# Patient Record
Sex: MALE | Race: WHITE
[De-identification: names, ages, dates, MRNs, and addresses within clinical notes are randomized per-mention and may not be internally consistent; named-entity substitution may affect disease eponyms.]

---

## 2017-12-27 ENCOUNTER — HOSPITAL ENCOUNTER (EMERGENCY)
Dept: HOSPITAL 10 - E/R | Age: 37
Discharge: HOME | End: 2017-12-27
Payer: SELF-PAY

## 2017-12-27 VITALS
BODY MASS INDEX: 35.82 KG/M2 | WEIGHT: 270.29 LBS | HEIGHT: 73 IN | HEIGHT: 73 IN | WEIGHT: 270.29 LBS | BODY MASS INDEX: 35.82 KG/M2

## 2017-12-27 VITALS
DIASTOLIC BLOOD PRESSURE: 79 MMHG | HEART RATE: 88 BPM | TEMPERATURE: 98.2 F | SYSTOLIC BLOOD PRESSURE: 135 MMHG | RESPIRATION RATE: 18 BRPM

## 2017-12-27 DIAGNOSIS — E11.65: Primary | ICD-10-CM

## 2017-12-27 DIAGNOSIS — Z79.84: ICD-10-CM

## 2017-12-27 DIAGNOSIS — I10: ICD-10-CM

## 2017-12-27 LAB
ADD UMIC: YES
ANION GAP SERPL CALC-SCNC: 17 MMOL/L (ref 8–16)
ANION GAP SERPL CALC-SCNC: 24 MMOL/L (ref 8–16)
BUN SERPL-MCNC: 13 MG/DL (ref 7–20)
BUN SERPL-MCNC: 15 MG/DL (ref 7–20)
CALCIUM SERPL-MCNC: 10.3 MG/DL (ref 8.4–10.2)
CALCIUM SERPL-MCNC: 9.4 MG/DL (ref 8.4–10.2)
CHLORIDE SERPL-SCNC: 102 MMOL/L (ref 97–110)
CHLORIDE SERPL-SCNC: 95 MMOL/L (ref 97–110)
CO2 SERPL-SCNC: 20 MMOL/L (ref 21–31)
CO2 SERPL-SCNC: 24 MMOL/L (ref 21–31)
COLOR UR: COLORLESS
CREAT SERPL-MCNC: 0.78 MG/DL (ref 0.61–1.24)
CREAT SERPL-MCNC: 0.84 MG/DL (ref 0.61–1.24)
EOSINOPHIL NFR BLD MANUAL: 2 % (ref 0–7)
ERYTHROCYTE [DISTWIDTH] IN BLOOD BY AUTOMATED COUNT: 11.1 % (ref 11.5–14.5)
GLUCOSE SERPL-MCNC: 367 MG/DL (ref 70–220)
GLUCOSE SERPL-MCNC: 630 MG/DL (ref 70–220)
GLUCOSE UR STRIP-MCNC: (no result) MG/DL
HCT VFR BLD CALC: 46.1 % (ref 42–52)
HGB BLD-MCNC: 17.5 G/DL (ref 14–18)
KETONES UR STRIP.AUTO-MCNC: (no result) MG/DL
MCH RBC QN AUTO: 30.9 PG (ref 29–33)
MCHC RBC AUTO-ENTMCNC: 38 G/DL (ref 32–37)
MCV RBC AUTO: 81.5 FL (ref 82–101)
MONOCYTES % (M): 5 % (ref 0–11)
NITRITE UR QL STRIP.AUTO: NEGATIVE MG/DL
PLATELET # BLD EST: NORMAL 10*3/UL
PLATELET # BLD: 186 10^3/UL (ref 140–440)
PMV BLD AUTO: 13.8 FL (ref 7.4–10.4)
POTASSIUM SERPL-SCNC: 4.3 MMOL/L (ref 3.5–5.1)
POTASSIUM SERPL-SCNC: 4.6 MMOL/L (ref 3.5–5.1)
RBC # BLD AUTO: 5.66 10^6/UL (ref 4.7–6.1)
RBC # UR AUTO: (no result) MG/DL
SODIUM SERPL-SCNC: 134 MMOL/L (ref 135–144)
SODIUM SERPL-SCNC: 139 MMOL/L (ref 135–144)
UR ASCORBIC ACID: NEGATIVE MG/DL
UR BILIRUBIN (DIP): NEGATIVE MG/DL
UR CLARITY: CLEAR
UR PH (DIP): 6 (ref 5–9)
UR RBC: 1 /HPF (ref 0–5)
UR SPECIFIC GRAVITY (DIP): 1.03 (ref 1–1.03)
UR TOTAL PROTEIN (DIP): (no result) MG/DL
UROBILINOGEN UR STRIP-ACNC: NEGATIVE MG/DL
VARIANT LYMPHS NFR BLD MANUAL: 1 % (ref 0–0)
WBC # BLD AUTO: 8.6 10^3/UL (ref 4.8–10.8)
WBC # UR STRIP: NEGATIVE LEU/UL

## 2017-12-27 PROCEDURE — 36415 COLL VENOUS BLD VENIPUNCTURE: CPT

## 2017-12-27 PROCEDURE — 81001 URINALYSIS AUTO W/SCOPE: CPT

## 2017-12-27 PROCEDURE — 83605 ASSAY OF LACTIC ACID: CPT

## 2017-12-27 PROCEDURE — 82962 GLUCOSE BLOOD TEST: CPT

## 2017-12-27 PROCEDURE — 99284 EMERGENCY DEPT VISIT MOD MDM: CPT

## 2017-12-27 PROCEDURE — 80048 BASIC METABOLIC PNL TOTAL CA: CPT

## 2017-12-27 PROCEDURE — 96372 THER/PROPH/DIAG INJ SC/IM: CPT

## 2017-12-27 PROCEDURE — 85025 COMPLETE CBC W/AUTO DIFF WBC: CPT

## 2017-12-27 NOTE — ERD
ER Documentation


Chief Complaint


Chief Complaint


LOSS WT, POLYDYPSIA, POLYURIA X2 WKS, SEND BY [facility]





HPI


Patient is a 37-year-old male with hypertension who presents saying "I think I 

have diabetes".  He has headache.  He feels thirsty and he has been urinating 

frequently.  It started 15 days ago.  He was seen by the [facility]

's clinic today and was found to have high blood sugar so he was sent to the ER.





ROS


All systems reviewed and are negative except as per history of present illness.





Medications


Home Meds


Active Scripts


Metformin* (Glucophage*) 500 Mg Tab, 500 MG PO BID, #60 TAB


   Prov:TOBI PUCKETT MD         12/27/17





Allergies


Allergies:  


Coded Allergies:  


     No Known Allergy (Unverified , 12/27/17)





PMhx/Soc


History of Surgery:  No


Anesthesia Reaction:  No


Hx Neurological Disorder:  No


Hx Respiratory Disorders:  No


Hx Cardiac Disorders:  Yes (HTN)


Hx Psychiatric Problems:  No


Hx Miscellaneous Medical Probl:  Yes (DM)


Hx Alcohol Use:  No


Hx Substance Use:  No


Hx Tobacco Use:  No


Smoking Status:  Never smoker





FmHx


Family History:  diabetes





Physical Exam


Vitals





Vital Signs








  Date Time  Temp Pulse Resp B/P Pulse Ox O2 Delivery O2 Flow Rate FiO2


 


12/27/17 16:35 100.2 111 18 165/102 96   








Physical Exam


Const: No acute distress


Head:   Atraumatic 


Eyes:    Normal Conjunctiva


ENT:    Normal External Ears, Nose and Mouth.


Neck:               Full range of motion..~ No meningismus.


Resp:    Clear to auscultation bilaterally


Cardio:    Regular rate and rhythm, no murmurs


Abd:    Soft, non tender, non distended. Normal bowel sounds


Skin:    No petechiae or rashes


Back:    No midline or flank tenderness


Ext:    No cyanosis, or edema


Neur:    Awake and alert


Psych:    Normal Mood and Affect


Result Diagram:  


12/27/17 1720                                                                  

              12/27/17 1720





Results 24 hrs





 Laboratory Tests








Test


  12/27/17


17:20 12/27/17


17:24 12/27/17


18:22 12/27/17


19:33


 


White Blood Count 8.610^3/ul    


 


Red Blood Count 5.6610^6/ul    


 


Hemoglobin 17.5g/dl    


 


Hematocrit 46.1%    


 


Mean Corpuscular Volume 81.5fl    


 


Mean Corpuscular Hemoglobin 30.9pg    


 


Mean Corpuscular Hemoglobin


Concent 38.0g/dl 


  


  


  


 


 


Red Cell Distribution Width 11.1%    


 


Platelet Count 93404^3/UL    


 


Mean Platelet Volume 13.8fl    


 


Urine Color COLORLESS    


 


Urine Clarity CLEAR    


 


Urine pH 6.0    


 


Urine Specific Gravity 1.030    


 


Urine Ketones 1+mg/dL    


 


Urine Nitrite NEGATIVEmg/dL    


 


Urine Bilirubin NEGATIVEmg/dL    


 


Urine Urobilinogen NEGATIVEmg/dL    


 


Urine Leukocyte Esterase NEGATIVELeu/ul    


 


Urine Microscopic RBC 1/HPF    


 


Urine Microscopic WBC 1/HPF    


 


Urine Hemoglobin 1+mg/dL    


 


Urine Glucose 3+mg/dL    


 


Urine Total Protein 2+mg/dl    


 


Sodium Level 134mmol/L    


 


Potassium Level 4.6mmol/L    


 


Chloride Level 95mmol/L    


 


Carbon Dioxide Level 20mmol/L    


 


Anion Gap 24    


 


Blood Urea Nitrogen 15mg/dl    


 


Creatinine 0.84mg/dl    


 


Glucose Level 630mg/dl    


 


Lactic Acid Level 1.5mmol/L    


 


Calcium Level 10.3mg/dl    


 


Bedside Glucose  583mg/dL  424mg/dL  358mg/dL 








 Current Medications








 Medications


  (Trade)  Dose


 Ordered  Sig/Elisha


 Route


 PRN Reason  Start Time


 Stop Time Status Last Admin


Dose Admin


 


 Sodium Chloride  2,000 ml @ 


 1,000 mls/hr  Q2H STAT


 IV


   12/27/17 16:57


 12/27/17 18:56 DC 12/27/17 17:13


 


 


 Lactated Ringer's


  (Lr)  1,000 ml @ 


 1,000 mls/hr  Q1H STAT


 IV


   12/27/17 16:57


 12/27/17 17:56 DC 12/27/17 18:49


 


 


 Acetaminophen


  (Tylenol Tab)  650 mg  ONCE  ONCE


 PO


   12/27/17 19:00


 12/27/17 19:01 DC 12/27/17 18:47


 


 


 Insulin Human


 Lispro


  (Humalog)  10 unit  ONCE  STAT


 SC


   12/27/17 18:49


 12/27/17 18:50 DC 12/27/17 19:07


 











Procedures/MDM


Patient is a 37-year-old male with hypertension who presents with what appears 

to be new onset diabetes.  He was found to have hyperglycemia.  His bicarbonate 

is 20 and his urinalysis shows 1+ ketones and at this point I do not think this 

is acute diabetic ketoacidosis.  He is otherwise well-appearing.  He was given 

3 L of fluids for fluid resuscitation as well as 10 units of subcutaneous 

Humalog.  I believe outpatient management is appropriate but I will start him 

on metformin 500 mg twice a day.  Despite this he would need to follow-up 

closely with his primary doctor within 24-48 hours at the [facility].  He can return for any worsening symptoms.  His sugar improved 

significantly while in the emergency department.





Departure


Diagnosis:  


 Primary Impression:  


 Hyperglycemia


Condition:  Fair


Patient Instructions:  Hyperglycemia (High Blood Sugar)





Additional Instructions:  


Llame al doctor MAANA y edwige maria luisa BERNARDO PARA DENTRO DE 1-2 HERMOSILLO.Dgale a la 

secretaria que nosotros le instruimos hacer esta bernardo.Avise o llame si bates 

condicin se empeora antes de la bernardo. Regresa aqui si peor o no mejor.











TOBI PUCKETT MD Dec 27, 2017 19:51

## 2019-07-25 ENCOUNTER — HOSPITAL ENCOUNTER (EMERGENCY)
Dept: HOSPITAL 10 - FTE | Age: 39
Discharge: HOME | End: 2019-07-25
Payer: MEDICAID

## 2019-07-25 ENCOUNTER — HOSPITAL ENCOUNTER (EMERGENCY)
Dept: HOSPITAL 91 - FTE | Age: 39
Discharge: HOME | End: 2019-07-25
Payer: MEDICAID

## 2019-07-25 VITALS — HEART RATE: 83 BPM | SYSTOLIC BLOOD PRESSURE: 153 MMHG | RESPIRATION RATE: 18 BRPM | DIASTOLIC BLOOD PRESSURE: 92 MMHG

## 2019-07-25 VITALS
WEIGHT: 293.61 LBS | WEIGHT: 293.61 LBS | BODY MASS INDEX: 38.91 KG/M2 | BODY MASS INDEX: 38.91 KG/M2 | HEIGHT: 73 IN | HEIGHT: 73 IN

## 2019-07-25 DIAGNOSIS — R13.10: Primary | ICD-10-CM

## 2019-07-25 DIAGNOSIS — E11.9: ICD-10-CM

## 2019-07-25 DIAGNOSIS — T46.4X5A: ICD-10-CM

## 2019-07-25 DIAGNOSIS — I10: ICD-10-CM

## 2019-07-25 PROCEDURE — 99283 EMERGENCY DEPT VISIT LOW MDM: CPT

## 2019-07-25 NOTE — ERD
ER Documentation


Chief Complaint


Chief Complaint





allergic reaction to medication





HPI


39-year-old male who had an allergic reaction to lisinopril which eats first 


took this morning.  He then started having swelling of his tongue and difficulty


swallowing.  He went to his primary care doctor who gave him Benadryl and 


epinephrine and now he states he is feeling much better.  He has no difficulty 


breathing at this time and states the swelling of his tongue has gone down.  He 


has no chest pain or palpitations.  Plans to discontinue lisinopril.





ROS


All systems reviewed and are negative except as per history of present illness.





Medications


Home Meds


Active Scripts


Famotidine* (Famotidine*) 40 Mg Tablet, 40 MG PO BID, #20 TAB


   Prov:MATEO FIGUEREDO PA-C         7/25/19


Prednisone* (Prednisone*) 20 Mg Tab, 60 MG PO DAILY for 4 Days, TAB


   Prov:MATEO FIGUEREDO PA-C         7/25/19


Metformin* (Glucophage*) 500 Mg Tab, 500 MG PO BID, #60 TAB


   Prov:TOBI PUCKETT MD         12/27/17





Allergies


Allergies:  


Coded Allergies:  


     No Known Allergy (Unverified , 12/27/17)





PMhx/Soc


History of Surgery:  No


Anesthesia Reaction:  No


Hx Neurological Disorder:  No


Hx Respiratory Disorders:  No


Hx Cardiac Disorders:  Yes (HTN)


Hx Psychiatric Problems:  No


Hx Miscellaneous Medical Probl:  Yes (DM)


Hx Alcohol Use:  No


Hx Substance Use:  No


Hx Tobacco Use:  No





FmHx


Family History:  diabetes





Physical Exam


Vitals





Vital Signs


  Date      Temp  Pulse  Resp  B/P (MAP)   Pulse Ox  O2          O2 Flow    FiO2


Time                                                 Delivery    Rate


   7/25/19  99.5     85    24      146/60        97


     12:14                           (88)





Physical Exam


Const:   No acute distress


Head:   Atraumatic 


Eyes:    Normal Conjunctiva


ENT:    Normal External Ears, Nose.  No significant lip or tongue swelling, 


oropharynx clear, speaks in full sentences


Neck:               Full range of motion. No meningismus.


Resp:   Clear to auscultation bilaterally


Cardio:   Regular rate and rhythm, no murmurs





Procedures/MDM


Patient has resolving allergic reaction secondary to taking lisinopril.  He 


states his symptoms are much improved from earlier today when he went to his 


primary care doctor.  He already got epinephrine and Benadryl with improvement. 


He is diabetic but his blood sugar is well controlled and his blood sugar today 


in primary care office was 136 and that was without taking his diabetic 


medications today.  Therefore I do feel comfortable placing him on a short 


course of prednisone.  I did explain to him that this could elevate his blood 


sugar and he should monitor more closely his sugar at home and return for any 


new or worsening symptoms or significant elevation of blood sugar.  Patient 


counseled regarding my diagnostic impression and care plan. Prior to discharge 


all questions answered. Pt agrees with treatment plan and understands strict 


return precautions. Pt is instructed to follow up with primary care provider 


within 24-48 hours. Precautionary instructions provided including instructions 


to return to the ER if not improving or for any worsening or changing symptoms 


or concerns.





Departure


Diagnosis:  


   Primary Impression:  


   Allergic reaction


Condition:  Stable


Patient Instructions:  Allergic Reaction, Drug





Additional Instructions:  


Llame al doctor MAANA y edwige maria luisa BERANRDO PARA DENTRO DE 1-2 HERMOSILLO.Dgale a la 


secretaria que nosotros le instruimos hacer esta bernardo.Avise o llame si bates 


condicin se empeora antes de la bernardo. Regresa aqui si peor o no mejor.











MATEO FIGUEREDO PA-C            Jul 25, 2019 12:47